# Patient Record
Sex: FEMALE | Race: WHITE | NOT HISPANIC OR LATINO | URBAN - METROPOLITAN AREA
[De-identification: names, ages, dates, MRNs, and addresses within clinical notes are randomized per-mention and may not be internally consistent; named-entity substitution may affect disease eponyms.]

---

## 2017-12-03 ENCOUNTER — EMERGENCY (EMERGENCY)
Facility: HOSPITAL | Age: 57
LOS: 1 days | Discharge: HOME | End: 2017-12-03
Attending: EMERGENCY MEDICINE | Admitting: EMERGENCY MEDICINE
Payer: COMMERCIAL

## 2017-12-03 VITALS
SYSTOLIC BLOOD PRESSURE: 117 MMHG | OXYGEN SATURATION: 100 % | RESPIRATION RATE: 18 BRPM | DIASTOLIC BLOOD PRESSURE: 67 MMHG | WEIGHT: 115.08 LBS | TEMPERATURE: 99 F | HEART RATE: 85 BPM

## 2017-12-03 PROCEDURE — 73120 X-RAY EXAM OF HAND: CPT | Mod: 26,RT

## 2017-12-03 PROCEDURE — 73110 X-RAY EXAM OF WRIST: CPT | Mod: 26,RT

## 2017-12-03 PROCEDURE — 73110 X-RAY EXAM OF WRIST: CPT

## 2017-12-03 PROCEDURE — 73120 X-RAY EXAM OF HAND: CPT

## 2017-12-03 PROCEDURE — 99284 EMERGENCY DEPT VISIT MOD MDM: CPT | Mod: 25

## 2017-12-03 PROCEDURE — 99283 EMERGENCY DEPT VISIT LOW MDM: CPT

## 2017-12-03 NOTE — ED PROVIDER NOTE - MEDICAL DECISION MAKING DETAILS
Pain at dorusm of right hand s/p hitting it against car door. no swelling, ttp at base of hand/wrist.  will get xray, patient declined pain control. FROM of hand and fingers.

## 2017-12-03 NOTE — ED PROVIDER NOTE - OBJECTIVE STATEMENT
57 year old female with right wrist pain s/p hitting it against car door yesterday morning. c/o pain to dorsum of right hand.  able to move it. took motrin yesterday and today. No other injuries.

## 2017-12-03 NOTE — ED ADULT NURSE NOTE - OBJECTIVE STATEMENT
Pt is a 56 yo female with the co right wrist pain sp hitting it against a car door yesterday morning. Pt motor and sensory intact, she is reporting pain to the dorsum of the right hand. She has been taking motrin at home with only partial relief. Pt denies any other complaints. She has no pmh.

## 2018-10-09 NOTE — ED ADULT TRIAGE NOTE - CCCP TRG CHIEF CMPLNT
10/03/18 1530 Wound Ankle Right;Medial  
Date First Assessed/Time First Assessed: 08/21/18 1033   POA: Yes  Wound Type: Vascular  Location: Ankle  Orientation: Right;Medial  
DRESSING STATUS Clean, dry, and intact DRESSING TYPE Absorptive Non-Pressure Injury Full thickness (subcut/muscle) Wound Length (cm) 1.4 cm Wound Width (cm) 2 cm Wound Depth (cm) 0.1 Wound Surface area (cm^2) 2.8 cm^2 Change in Wound Size % -1020 Condition of Base Pink;Slough Condition of Edges Open Tissue Type Pink;Red Tissue Type Percent Pink 80 Tissue Type Percent Red 20 Drainage Amount  Small Drainage Color Serosanguinous Wound Odor None Periwound Skin Condition Intact Cleansing and Cleansing Agents  Dermal wound cleanser (vashe) Dressing Type Applied (santyl,2x2,4x4,kaley,tubigrip) Procedure Tolerated Well Wound Sacral/coccyx Right Date First Assessed/Time First Assessed: 07/16/18 1147   POA: Yes  Wound Type: Pressure injury  Location: Sacral/coccyx  Orientation: Right DRESSING STATUS Clean, dry, and intact DRESSING TYPE Foam  
Non-Pressure Injury Partial thickness (epider/derm) Wound Length (cm) 1.5 cm Wound Width (cm) 1.2 cm Wound Depth (cm) 0.1 Wound Surface area (cm^2) 1.8 cm^2 Change in Wound Size % -87.5 Condition of Base White;Slough Condition of Edges Open Tissue Type Other (comment) (white) Drainage Amount  Small Drainage Color Clear Wound Odor None Periwound Skin Condition Intact Cleansing and Cleansing Agents  Dermal wound cleanser Dressing Type Applied (rula,mepitel one,mepilex border) Procedure Tolerated Well wrist pain/injury

## 2020-05-05 ENCOUNTER — APPOINTMENT (OUTPATIENT)
Dept: DISASTER EMERGENCY | Facility: HOSPITAL | Age: 60
End: 2020-05-05

## 2020-05-05 ENCOUNTER — MESSAGE (OUTPATIENT)
Age: 60
End: 2020-05-05

## 2020-05-07 LAB
SARS-COV-2 IGG SERPL IA-ACNC: <0.1 INDEX
SARS-COV-2 IGG SERPL QL IA: NEGATIVE

## 2022-01-01 NOTE — ED PROVIDER NOTE - CARDIAC, MLM
Mom nursing infant at the time of my visit. Deep latch noted with swallows heard. Mom chooses to pump the right breast while nursing infant on the left side, due to having an inverted right nipple. Mom expresses confidence in her plans and will call for assistance as needed. +s1s2

## 2022-01-07 ENCOUNTER — APPOINTMENT (OUTPATIENT)
Dept: MAMMOGRAPHY | Facility: CLINIC | Age: 62
End: 2022-01-07

## 2022-02-11 ENCOUNTER — APPOINTMENT (OUTPATIENT)
Dept: ULTRASOUND IMAGING | Facility: CLINIC | Age: 62
End: 2022-02-11
Payer: COMMERCIAL

## 2022-02-11 ENCOUNTER — APPOINTMENT (OUTPATIENT)
Dept: MAMMOGRAPHY | Facility: CLINIC | Age: 62
End: 2022-02-11
Payer: COMMERCIAL

## 2022-02-11 PROCEDURE — 76641 ULTRASOUND BREAST COMPLETE: CPT | Mod: 50

## 2022-02-11 PROCEDURE — 76856 US EXAM PELVIC COMPLETE: CPT

## 2022-02-11 PROCEDURE — 76830 TRANSVAGINAL US NON-OB: CPT

## 2022-02-11 PROCEDURE — 77063 BREAST TOMOSYNTHESIS BI: CPT

## 2022-02-11 PROCEDURE — 77067 SCR MAMMO BI INCL CAD: CPT

## 2022-03-06 ENCOUNTER — EMERGENCY (EMERGENCY)
Facility: HOSPITAL | Age: 62
LOS: 1 days | Discharge: HOME | End: 2022-03-06
Attending: STUDENT IN AN ORGANIZED HEALTH CARE EDUCATION/TRAINING PROGRAM
Payer: COMMERCIAL

## 2022-03-06 VITALS
TEMPERATURE: 98 F | OXYGEN SATURATION: 98 % | DIASTOLIC BLOOD PRESSURE: 66 MMHG | HEART RATE: 84 BPM | SYSTOLIC BLOOD PRESSURE: 104 MMHG | RESPIRATION RATE: 20 BRPM

## 2022-03-06 VITALS
DIASTOLIC BLOOD PRESSURE: 77 MMHG | RESPIRATION RATE: 20 BRPM | WEIGHT: 113.98 LBS | OXYGEN SATURATION: 97 % | HEART RATE: 100 BPM | TEMPERATURE: 98 F | HEIGHT: 64 IN | SYSTOLIC BLOOD PRESSURE: 147 MMHG

## 2022-03-06 LAB
ALBUMIN SERPL ELPH-MCNC: 4.5 G/DL — SIGNIFICANT CHANGE UP (ref 3.3–5)
ALP SERPL-CCNC: 85 U/L — SIGNIFICANT CHANGE UP (ref 40–120)
ALT FLD-CCNC: 16 U/L — SIGNIFICANT CHANGE UP (ref 10–45)
ANION GAP SERPL CALC-SCNC: 10 MMOL/L — SIGNIFICANT CHANGE UP (ref 5–17)
APTT BLD: 32.2 SEC — SIGNIFICANT CHANGE UP (ref 27.5–35.5)
AST SERPL-CCNC: 21 U/L — SIGNIFICANT CHANGE UP (ref 10–40)
BASOPHILS # BLD AUTO: 0.04 K/UL — SIGNIFICANT CHANGE UP (ref 0–0.2)
BASOPHILS NFR BLD AUTO: 0.6 % — SIGNIFICANT CHANGE UP (ref 0–2)
BILIRUB SERPL-MCNC: 0.3 MG/DL — SIGNIFICANT CHANGE UP (ref 0.2–1.2)
BLD GP AB SCN SERPL QL: NEGATIVE — SIGNIFICANT CHANGE UP
BUN SERPL-MCNC: 15 MG/DL — SIGNIFICANT CHANGE UP (ref 7–23)
CALCIUM SERPL-MCNC: 9.3 MG/DL — SIGNIFICANT CHANGE UP (ref 8.4–10.5)
CHLORIDE SERPL-SCNC: 102 MMOL/L — SIGNIFICANT CHANGE UP (ref 96–108)
CO2 SERPL-SCNC: 28 MMOL/L — SIGNIFICANT CHANGE UP (ref 22–31)
CREAT SERPL-MCNC: 0.83 MG/DL — SIGNIFICANT CHANGE UP (ref 0.5–1.3)
EGFR: 81 ML/MIN/1.73M2 — SIGNIFICANT CHANGE UP
EOSINOPHIL # BLD AUTO: 0.1 K/UL — SIGNIFICANT CHANGE UP (ref 0–0.5)
EOSINOPHIL NFR BLD AUTO: 1.4 % — SIGNIFICANT CHANGE UP (ref 0–6)
GLUCOSE SERPL-MCNC: 105 MG/DL — HIGH (ref 70–99)
HCT VFR BLD CALC: 43.6 % — SIGNIFICANT CHANGE UP (ref 34.5–45)
HGB BLD-MCNC: 14.4 G/DL — SIGNIFICANT CHANGE UP (ref 11.5–15.5)
IMM GRANULOCYTES NFR BLD AUTO: 0.3 % — SIGNIFICANT CHANGE UP (ref 0–1.5)
INR BLD: 1.05 RATIO — SIGNIFICANT CHANGE UP (ref 0.88–1.16)
LYMPHOCYTES # BLD AUTO: 1.43 K/UL — SIGNIFICANT CHANGE UP (ref 1–3.3)
LYMPHOCYTES # BLD AUTO: 20.6 % — SIGNIFICANT CHANGE UP (ref 13–44)
MCHC RBC-ENTMCNC: 30.4 PG — SIGNIFICANT CHANGE UP (ref 27–34)
MCHC RBC-ENTMCNC: 33 GM/DL — SIGNIFICANT CHANGE UP (ref 32–36)
MCV RBC AUTO: 92 FL — SIGNIFICANT CHANGE UP (ref 80–100)
MONOCYTES # BLD AUTO: 0.44 K/UL — SIGNIFICANT CHANGE UP (ref 0–0.9)
MONOCYTES NFR BLD AUTO: 6.3 % — SIGNIFICANT CHANGE UP (ref 2–14)
NEUTROPHILS # BLD AUTO: 4.92 K/UL — SIGNIFICANT CHANGE UP (ref 1.8–7.4)
NEUTROPHILS NFR BLD AUTO: 70.8 % — SIGNIFICANT CHANGE UP (ref 43–77)
NRBC # BLD: 0 /100 WBCS — SIGNIFICANT CHANGE UP (ref 0–0)
PLATELET # BLD AUTO: 204 K/UL — SIGNIFICANT CHANGE UP (ref 150–400)
POTASSIUM SERPL-MCNC: 4 MMOL/L — SIGNIFICANT CHANGE UP (ref 3.5–5.3)
POTASSIUM SERPL-SCNC: 4 MMOL/L — SIGNIFICANT CHANGE UP (ref 3.5–5.3)
PROT SERPL-MCNC: 7.1 G/DL — SIGNIFICANT CHANGE UP (ref 6–8.3)
PROTHROM AB SERPL-ACNC: 12.1 SEC — SIGNIFICANT CHANGE UP (ref 10.5–13.4)
RBC # BLD: 4.74 M/UL — SIGNIFICANT CHANGE UP (ref 3.8–5.2)
RBC # FLD: 11.4 % — SIGNIFICANT CHANGE UP (ref 10.3–14.5)
RH IG SCN BLD-IMP: POSITIVE — SIGNIFICANT CHANGE UP
SODIUM SERPL-SCNC: 140 MMOL/L — SIGNIFICANT CHANGE UP (ref 135–145)
WBC # BLD: 6.95 K/UL — SIGNIFICANT CHANGE UP (ref 3.8–10.5)
WBC # FLD AUTO: 6.95 K/UL — SIGNIFICANT CHANGE UP (ref 3.8–10.5)

## 2022-03-06 PROCEDURE — 93005 ELECTROCARDIOGRAM TRACING: CPT | Mod: XU

## 2022-03-06 PROCEDURE — U0003: CPT

## 2022-03-06 PROCEDURE — 71045 X-RAY EXAM CHEST 1 VIEW: CPT | Mod: 26

## 2022-03-06 PROCEDURE — 85025 COMPLETE CBC W/AUTO DIFF WBC: CPT

## 2022-03-06 PROCEDURE — 86901 BLOOD TYPING SEROLOGIC RH(D): CPT

## 2022-03-06 PROCEDURE — 25605 CLTX DST RDL FX/EPHYS SEP W/: CPT | Mod: RT

## 2022-03-06 PROCEDURE — 71045 X-RAY EXAM CHEST 1 VIEW: CPT

## 2022-03-06 PROCEDURE — 73080 X-RAY EXAM OF ELBOW: CPT | Mod: 26,RT

## 2022-03-06 PROCEDURE — 86900 BLOOD TYPING SEROLOGIC ABO: CPT

## 2022-03-06 PROCEDURE — 73110 X-RAY EXAM OF WRIST: CPT | Mod: 26,RT,76

## 2022-03-06 PROCEDURE — 73100 X-RAY EXAM OF WRIST: CPT

## 2022-03-06 PROCEDURE — 86850 RBC ANTIBODY SCREEN: CPT

## 2022-03-06 PROCEDURE — 99285 EMERGENCY DEPT VISIT HI MDM: CPT | Mod: 25

## 2022-03-06 PROCEDURE — 73090 X-RAY EXAM OF FOREARM: CPT

## 2022-03-06 PROCEDURE — 73110 X-RAY EXAM OF WRIST: CPT

## 2022-03-06 PROCEDURE — 80053 COMPREHEN METABOLIC PANEL: CPT

## 2022-03-06 PROCEDURE — 93010 ELECTROCARDIOGRAM REPORT: CPT

## 2022-03-06 PROCEDURE — U0005: CPT

## 2022-03-06 PROCEDURE — 85730 THROMBOPLASTIN TIME PARTIAL: CPT

## 2022-03-06 PROCEDURE — 73080 X-RAY EXAM OF ELBOW: CPT

## 2022-03-06 PROCEDURE — 85610 PROTHROMBIN TIME: CPT

## 2022-03-06 PROCEDURE — 36415 COLL VENOUS BLD VENIPUNCTURE: CPT

## 2022-03-06 PROCEDURE — 99285 EMERGENCY DEPT VISIT HI MDM: CPT

## 2022-03-06 PROCEDURE — 73090 X-RAY EXAM OF FOREARM: CPT | Mod: 26,LT,76

## 2022-03-06 RX ORDER — OXYCODONE HYDROCHLORIDE 5 MG/1
1 TABLET ORAL
Qty: 8 | Refills: 0
Start: 2022-03-06 | End: 2022-03-07

## 2022-03-06 RX ORDER — IBUPROFEN 200 MG
600 TABLET ORAL ONCE
Refills: 0 | Status: COMPLETED | OUTPATIENT
Start: 2022-03-06 | End: 2022-03-06

## 2022-03-06 RX ORDER — ACETAMINOPHEN 500 MG
975 TABLET ORAL ONCE
Refills: 0 | Status: COMPLETED | OUTPATIENT
Start: 2022-03-06 | End: 2022-03-06

## 2022-03-06 RX ADMIN — Medication 600 MILLIGRAM(S): at 18:16

## 2022-03-06 RX ADMIN — Medication 975 MILLIGRAM(S): at 18:16

## 2022-03-06 NOTE — CONSULT NOTE ADULT - CONSULT REASON
May 2, 2017      Gerri Garcia MD  [address]  [place] [address]           [facility] - Otorhinolaryngology  [address]  [place] [address]  Phone: [phone]  Fax: [phone]          Patient: Dominique Everett   MR Number: [id number]   YOB: 2008   Date of Visit: 4/28/2017       Dear Dr. Gerri Garcia:    Thank you for referring Dominique Everett to me for evaluation. Attached you will find relevant portions of my assessment and plan of care.    If you have questions, please do not hesitate to call me. I look forward to following Dominique Everett along with you.    Sincerely,    Silvia Baptiste, NP    Enclosure  CC:  No Recipients    If you would like to receive this communication electronically, please contact [email or web address] or [phone] to request more information on [facility] access.    For providers and/or their staff who would like to refer a patient to [facility], please contact us through our one-stop-shop provider referral line, [facility], at [phone].    If you feel you have received this communication in error or would no longer like to receive these types of communications, please e-mail [email or web address]         
distal radius fracture

## 2022-03-06 NOTE — CONSULT NOTE ADULT - SUBJECTIVE AND OBJECTIVE BOX
61yFemale presents to [facility] ED c/o severe R wrist pain s/p mechanical fall while playing tennis [employer]. 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 Patient denies head hit or LOC. Localizing pain to distal radius. Denies radiation of pain. Pt denies numbness, tingling or burning. LHD. Patient denies any other injuries.    PMH:    PSH:    AH:  penicillin (Unknown)    Meds: See med rec    T(C): 36.8 (03-06-22 @ 21:09)  HR: 84 (03-06-22 @ 21:09)  BP: 104/66 (03-06-22 @ 21:09)  RR: 20 (03-06-22 @ 21:09)  SpO2: 98% (03-06-22 @ 21:09)  Wt(kg): --    PE R UE  Skin intact, visible deformity of wrist, + soft tissue swelling, no ecchymosis; Decreased ROM of Wrist 2/2 pain. Normal Elbow/Shoulder ROM w/o pain. + TTP over DR/Ulna. + Rad Pulse 2+/4. SILT C5-T1, +AIN/PIN/Ulnar/Radial/Musc/Median, soft compartments;    SECONDARY EXAM: Benign, Skin intact, SILT throughout, motor grossly intact throughout, no other orthopedic injuries at this time, compartments soft and compressible    SPINE: Skin intact, no bony tenderness or step-offs appreciated throughout cervical/thoracic/lumbar/sacral spine    L UE: Skin intact, no erythema, ecchymosis, edema, gross deformity, NTTP over the bony prominences of the shoulder/elbow/wrist/hand, painless passive/active ROM of the shoulder/elbow/wrist/hand, C5-T1 SILT, motor grossly intact throughout axillary/musculocutaenous/radial/median/ulnar nerves, + radial pulse    BL LE: Skin intact, no erythema, ecchymosis, edema, gross deformity, NTTP over the bony prominences of the hip/knee/ankle/foot, painless passive/active ROM of the hip/knee/ankle/foot, L2-S1 SILT, motor grossly intact throughout hip flexors/quads/hams/TA/EHL/FHL/GSC, + DP/PT pulses, no pain with log roll, no pain on axial loading, compartments soft and compressible, calves nontender      Imaging:  XRay R Wrist  3 views of R Wrist demonstrates R distal radius fracture, extra-articular w/ posterior/anterior displacement of carpus/hand in relation to forearm. No other fx/dislocations noted.       Procedure Note:  After verbal consent obtained, ~ 10 cc of 1% Lidocaine injected into area around DR/Ulna as hematoma block. UE hung by fingers and reduction maneuver performed. Sugartong splint applied to Forearm/Wrist and mold held. ME XR obtained which show improved alignment of R DR Fracture. Pt NVI post procedure. Pt tolerated procedure well.    A/P: 61yFemale s/p Mech Fall w/ R Distal Radius Fracture  - Pain control  - Strict Ice/Elevation  - NWB R UE with splint and sling  - Keep splint clean/dry/intact;  - Encourage active finger motion to help with swelling  - Pt aware of possible need for surgical intervention of distal radius fracture. Will be scheduled for surgery electively with Dr. Squires  - Pt made aware of signs and symptoms of compartment syndrome. Aware of need to contact Doctor / Return to ED  - All Pt's / Family Members questions answered, Pt/family understand plan.  - FU w/ Dr. Squires and likely surgery on Tuesday

## 2022-03-06 NOTE — ED PROVIDER NOTE - ATTENDING CONTRIBUTION TO CARE
I, Stefan Warner, performed a history and physical exam of the patient and discussed their management with the resident and/or advanced care provider. I reviewed the resident and/or advanced care provider's note and agree with the documented findings and plan of care. I was present and available for all procedures.    62yo female pt with PMHx of SVT (Inderal PRN) presents to ED with non dominant right wrist pain/swelling/deformity s/p fall while playing tennis this afternoon. Denies LOC, head injury or other injuries. Denies headache, dizziness, visual changes or N/V. Denies sensory changes or weakness to extremities. Denies neck or back pain. Denies CP/SOB/ABD pain or pelvic/hip pain. Denies fever, chills, cough or congestion.    Primary Survey  A - airway intact  B - bilateral breath sounds and good chest rise  C - initially BP: ##### , palpable pulses in all extremities  D - GCS ##### on arrival  Exposure obtained    Secondary survey  Gen: NAD  HEENT: NC, C-spine no ttp, pupils 3mm equal & reactive  CV: pulse regularly present, no chest wall ttp  Pulm: CTA B/L  Chest: intact without ttp  Abd: Soft, ND, NT, no rebound, no guarding  Groin: Normal appearing  Ext: Palp radial b/l, palp DP b/l, stable pelvis, rue with deformity at distal forearm, s/s intact in distal hand and without ttp at elbow or right shoulder, normal FROM bilateral le's and lue   Back: no TTP, no palpable runoff, stepoff, or deformity, no c/t/l spine midline ttp,    isolated rue fractures in distal forearm no head trauma, will eval with xrays analgesia prn, patient and  at bedside requesting orthopedics for fracture reduction and splinting, Follow up optnt after reduction I, Stefan Warner, performed a history and physical exam of the patient and discussed their management with the resident and/or advanced care provider. I reviewed the resident and/or advanced care provider's note and agree with the documented findings and plan of care. I was present and available for all procedures.    62yo female pt with PMHx of SVT (Inderal PRN) presents to ED with non dominant right wrist pain/swelling/deformity s/p fall while playing tennis this afternoon. Denies LOC, head injury or other injuries. Denies headache, dizziness, visual changes or N/V. Denies sensory changes or weakness to extremities. Denies neck or back pain. Denies CP/SOB/ABD pain or pelvic/hip pain. Denies fever, chills, cough or congestion.    Primary Survey  A - airway intact  B - bilateral breath sounds and good chest rise  C - initially BP: 147/77, palpable pulses in all extremities  D - GCS15 on arrival  Exposure obtained    Secondary survey  Gen: NAD  HEENT: NC, C-spine no ttp, pupils 3mm equal & reactive  CV: pulse regularly present, no chest wall ttp  Pulm: CTA B/L  Chest: intact without ttp  Abd: Soft, ND, NT, no rebound, no guarding  Groin: Normal appearing  Ext: Palp radial b/l, palp DP b/l, stable pelvis, rue with deformity at distal forearm, s/s intact in distal hand and without ttp at elbow or right shoulder, normal FROM bilateral le's and lue   Back: no TTP, no palpable runoff, stepoff, or deformity, no c/t/l spine midline ttp,    isolated rue fractures in distal forearm no head trauma, will eval with xrays analgesia prn, patient and  at bedside requesting orthopedics for fracture reduction and splinting, Follow up optnt after reduction

## 2022-03-06 NOTE — ED PROVIDER NOTE - OBJECTIVE STATEMENT
62yo female pt with PMHx of SVT (Inderal PRN) presents to ED with non dominant right wrist pain/swelling/deformity s/p fall while playing tennis this afternoon. Denies LOC, head injury or other injuries. Denies headache, dizziness, visual changes or N/V. Denies sensory changes or weakness to extremities. Denies neck or back pain. Denies CP/SOB/ABD pain or pelvic/hip pain. Denies fever, chills, cough or congestion.

## 2022-03-06 NOTE — ED PROVIDER NOTE - PATIENT PORTAL LINK FT
You can access the FollowMyHealth Patient Portal offered by [facility] by registering at the following website: [email or web address]. By joining [facility]’s FollowMyHealth portal, you will also be able to view your health information using other applications (apps) compatible with our system.

## 2022-03-06 NOTE — ED PROVIDER NOTE - NSFOLLOWUPINSTRUCTIONS_ED_ALL_ED_FT
Elevate the affected wrist.    Keep the splint clean and dry.    Ice over the splint; every 2hours for 20minutes.    Tylenol for pain as package directed and Oxycodone as prescribed for severe pain with cautions of drowsiness and constipation.    Stool softener as needed.    Follow up with Dr. Squires as scheduled.    Return for any concerns or worsening symptoms.    Please see the information of wrist fracture and splint care.

## 2022-03-06 NOTE — ED PROVIDER NOTE - PHYSICAL EXAMINATION
NAD, VSS, Afebrile, Lungs clear. No spinal tender. ABD soft, non tender. No CVA tender. No pelvic or hip tender. +Right wrist swelling, deformity, tender, and diminished ROMs. N/V- intact.

## 2022-03-06 NOTE — ED ADULT NURSE NOTE - OBJECTIVE STATEMENT
61 year old female patient presents ambulatory to ED c/o R wrist pain injury while playing tennis. Patient has obvious deformity to R wrist and is unable to move fourth and fifth digit. Pulses and sensory function intact. Patient denies other pain or injuries. Patient made aware of plan of care for monitoring. Family at bedside

## 2022-03-06 NOTE — ED PROVIDER NOTE - CARE PROVIDER_API CALL
Mello Squires (MD)  Orthopaedic Surgery  [address] [place] [address], [address]  [place] [address]  Phone: [phone]  Fax: [phone]  Follow Up Time:

## 2022-03-06 NOTE — ED PROVIDER NOTE - PROGRESS NOTE DETAILS
Ortho at bedside. Closed reduction of right wrist and STS performed by ortho. Pt's evaluated by Dr. Squires and scheduled surgery on Tuesday morning. Will d/c to home today after preop.

## 2022-03-06 NOTE — ED ADULT NURSE REASSESSMENT NOTE - NS ED NURSE REASSESS COMMENT FT1
Pt tolerated rt wrist reduction by ortho well, right arm splint and placed in sling care instructions review with pt,  plan for procedure this week , pre op labs and covid swab collected and sent. pending dispo

## 2022-03-06 NOTE — ED ADULT NURSE REASSESSMENT NOTE - NS ED NURSE REASSESS COMMENT FT1
Pt lying in stretcher with ortho assessing at bedside, right hand deformity noted, plan for nerve block and fx reduction and splinting, pt updated

## 2022-03-07 LAB — SARS-COV-2 RNA SPEC QL NAA+PROBE: SIGNIFICANT CHANGE UP

## 2022-03-25 ENCOUNTER — APPOINTMENT (OUTPATIENT)
Dept: MRI IMAGING | Facility: IMAGING CENTER | Age: 62
End: 2022-03-25
Payer: COMMERCIAL

## 2022-03-25 ENCOUNTER — OUTPATIENT (OUTPATIENT)
Dept: OUTPATIENT SERVICES | Facility: HOSPITAL | Age: 62
LOS: 1 days | End: 2022-03-25
Payer: COMMERCIAL

## 2022-03-25 DIAGNOSIS — Z00.8 ENCOUNTER FOR OTHER GENERAL EXAMINATION: ICD-10-CM

## 2022-03-25 PROCEDURE — A9585: CPT

## 2022-03-25 PROCEDURE — 77049 MRI BREAST C-+ W/CAD BI: CPT | Mod: 26

## 2022-03-25 PROCEDURE — C8908: CPT

## 2022-03-25 PROCEDURE — C8937: CPT

## 2022-10-10 ENCOUNTER — OUTPATIENT (OUTPATIENT)
Dept: OUTPATIENT SERVICES | Facility: HOSPITAL | Age: 62
LOS: 1 days | End: 2022-10-10
Payer: COMMERCIAL

## 2022-10-10 ENCOUNTER — APPOINTMENT (OUTPATIENT)
Dept: MRI IMAGING | Facility: IMAGING CENTER | Age: 62
End: 2022-10-10

## 2022-10-10 DIAGNOSIS — Z00.8 ENCOUNTER FOR OTHER GENERAL EXAMINATION: ICD-10-CM

## 2022-10-10 PROCEDURE — A9585: CPT

## 2022-10-10 PROCEDURE — C8937: CPT

## 2022-10-10 PROCEDURE — 77049 MRI BREAST C-+ W/CAD BI: CPT | Mod: 26

## 2022-10-10 PROCEDURE — C8908: CPT

## 2023-03-14 ENCOUNTER — APPOINTMENT (OUTPATIENT)
Dept: ULTRASOUND IMAGING | Facility: CLINIC | Age: 63
End: 2023-03-14
Payer: COMMERCIAL

## 2023-03-14 ENCOUNTER — APPOINTMENT (OUTPATIENT)
Dept: MAMMOGRAPHY | Facility: CLINIC | Age: 63
End: 2023-03-14
Payer: COMMERCIAL

## 2023-03-14 PROCEDURE — 77063 BREAST TOMOSYNTHESIS BI: CPT

## 2023-03-14 PROCEDURE — 76856 US EXAM PELVIC COMPLETE: CPT

## 2023-03-14 PROCEDURE — 76830 TRANSVAGINAL US NON-OB: CPT

## 2023-03-14 PROCEDURE — 76641 ULTRASOUND BREAST COMPLETE: CPT | Mod: 50

## 2023-03-14 PROCEDURE — 77067 SCR MAMMO BI INCL CAD: CPT

## 2023-03-29 ENCOUNTER — TRANSCRIPTION ENCOUNTER (OUTPATIENT)
Age: 63
End: 2023-03-29

## 2023-04-11 ENCOUNTER — OUTPATIENT (OUTPATIENT)
Dept: OUTPATIENT SERVICES | Facility: HOSPITAL | Age: 63
LOS: 1 days | End: 2023-04-11
Payer: COMMERCIAL

## 2023-04-11 ENCOUNTER — APPOINTMENT (OUTPATIENT)
Dept: MRI IMAGING | Facility: IMAGING CENTER | Age: 63
End: 2023-04-11
Payer: COMMERCIAL

## 2023-04-11 DIAGNOSIS — Z00.8 ENCOUNTER FOR OTHER GENERAL EXAMINATION: ICD-10-CM

## 2023-04-11 DIAGNOSIS — Z80.3 FAMILY HISTORY OF MALIGNANT NEOPLASM OF BREAST: ICD-10-CM

## 2023-04-11 PROCEDURE — 77049 MRI BREAST C-+ W/CAD BI: CPT | Mod: 26

## 2023-04-11 PROCEDURE — C8937: CPT

## 2023-04-11 PROCEDURE — C8908: CPT

## 2023-04-11 PROCEDURE — A9585: CPT

## 2023-05-30 ENCOUNTER — APPOINTMENT (OUTPATIENT)
Dept: PLASTIC SURGERY | Facility: CLINIC | Age: 63
End: 2023-05-30

## 2023-08-25 ENCOUNTER — NON-APPOINTMENT (OUTPATIENT)
Age: 63
End: 2023-08-25

## 2023-09-22 NOTE — ED ADULT TRIAGE NOTE - BRAND OF FIRST COVID-19 BOOSTER
Moderna
45-year-old female past medical history of gastric bypass in  (c/b bradycardia and palpitations postop), tubal ligation,  x2 presenting to emergency department for 3 to 4 days of acute onset, progressively worsening left upper quadrant pain with associated nausea vomiting. Differential diagnosis includes but is not limited to GERD, gastritis, gastric ulcer, PUD, pancreatitis, gastroenteritis. Exam notable for TTP in LUQ. Afebrile without systemic infectious signs or symptoms. Hemodynamically stable. Given hx of gastric bypass will follow repletion protocol thiamine, multivitamin, LR. Surgery consulted. Will get screening labs, lipase, VBG, CTAP with PO and IV. Low suspicion for obstruction +BM, +flatus. Will reassess, dispo pending labs and imaging. Symptomatic relief with gi cocktail, zofran. Ofirmev for pain.

## 2024-04-23 ENCOUNTER — APPOINTMENT (OUTPATIENT)
Dept: ULTRASOUND IMAGING | Facility: CLINIC | Age: 64
End: 2024-04-23
Payer: COMMERCIAL

## 2024-04-23 ENCOUNTER — APPOINTMENT (OUTPATIENT)
Dept: MAMMOGRAPHY | Facility: CLINIC | Age: 64
End: 2024-04-23
Payer: COMMERCIAL

## 2024-04-23 PROCEDURE — 77063 BREAST TOMOSYNTHESIS BI: CPT

## 2024-04-23 PROCEDURE — 76641 ULTRASOUND BREAST COMPLETE: CPT | Mod: 50

## 2024-04-23 PROCEDURE — 77067 SCR MAMMO BI INCL CAD: CPT

## 2024-05-07 ENCOUNTER — APPOINTMENT (OUTPATIENT)
Dept: ULTRASOUND IMAGING | Facility: CLINIC | Age: 64
End: 2024-05-07
Payer: COMMERCIAL

## 2024-05-07 PROCEDURE — 76830 TRANSVAGINAL US NON-OB: CPT

## 2024-05-07 PROCEDURE — 76856 US EXAM PELVIC COMPLETE: CPT

## 2024-08-20 ENCOUNTER — OUTPATIENT (OUTPATIENT)
Dept: OUTPATIENT SERVICES | Facility: HOSPITAL | Age: 64
LOS: 1 days | End: 2024-08-20
Payer: COMMERCIAL

## 2024-08-20 ENCOUNTER — APPOINTMENT (OUTPATIENT)
Dept: MRI IMAGING | Facility: IMAGING CENTER | Age: 64
End: 2024-08-20

## 2024-08-20 DIAGNOSIS — Z91.89 OTHER SPECIFIED PERSONAL RISK FACTORS, NOT ELSEWHERE CLASSIFIED: ICD-10-CM

## 2024-08-20 PROCEDURE — A9585: CPT

## 2024-08-20 PROCEDURE — C8937: CPT

## 2024-08-20 PROCEDURE — 77049 MRI BREAST C-+ W/CAD BI: CPT | Mod: 26

## 2024-08-20 PROCEDURE — C8908: CPT

## 2024-09-06 PROBLEM — Z91.89 INCREASED RISK OF BREAST CANCER: Status: ACTIVE | Noted: 2024-09-06

## 2024-09-06 PROBLEM — Z80.3 FAMILY HISTORY OF BREAST CANCER: Status: ACTIVE | Noted: 2024-09-06

## 2024-09-09 ENCOUNTER — APPOINTMENT (OUTPATIENT)
Dept: PLASTIC SURGERY | Facility: CLINIC | Age: 64
End: 2024-09-09
Payer: COMMERCIAL

## 2024-09-09 VITALS
DIASTOLIC BLOOD PRESSURE: 82 MMHG | WEIGHT: 115 LBS | BODY MASS INDEX: 19.63 KG/M2 | TEMPERATURE: 97.6 F | OXYGEN SATURATION: 98 % | SYSTOLIC BLOOD PRESSURE: 122 MMHG | HEIGHT: 64 IN | HEART RATE: 82 BPM

## 2024-09-09 DIAGNOSIS — Z80.3 FAMILY HISTORY OF MALIGNANT NEOPLASM OF BREAST: ICD-10-CM

## 2024-09-09 DIAGNOSIS — Z91.89 OTHER SPECIFIED PERSONAL RISK FACTORS, NOT ELSEWHERE CLASSIFIED: ICD-10-CM

## 2024-09-09 PROCEDURE — 99203 OFFICE O/P NEW LOW 30 MIN: CPT

## 2024-09-09 NOTE — PHYSICAL EXAM
[Normocephalic] : normocephalic [Atraumatic] : atraumatic [EOMI] : extra ocular movement intact [Sclera nonicteric] : sclera nonicteric [Supple] : supple [No Supraclavicular Adenopathy] : no supraclavicular adenopathy [No Cervical Adenopathy] : no cervical adenopathy [No Thyromegaly] : no thyromegaly [Clear to Auscultation Bilat] : clear to auscultation bilaterally [Normal Sinus Rhythm] : normal sinus rhythm [Normal S1, S2] : normal S1 and S2 [No Gallops] : no gallops [No Rubs] : no pericardial rub [Examined in the supine and seated position] : examined in the supine and seated position [Bra Size: ___] : Bra Size: [unfilled] [No dominant masses] : no dominant masses in right breast  [No dominant masses] : no dominant masses left breast [No Nipple Retraction] : no left nipple retraction [No Nipple Discharge] : no left nipple discharge [No Axillary Lymphadenopathy] : no left axillary lymphadenopathy [No Edema] : no edema [No Rashes] : no rashes [No Ulceration] : no ulceration [de-identified] : sternal area palpable mass consistent with sebaceous cyst without infection. [de-identified] : no skin lesion to account for the MRI finding. [de-identified] : no skin lesion to account for the MRI finding.

## 2024-09-09 NOTE — HISTORY OF PRESENT ILLNESS
[FreeTextEntry1] : Angela is a 64 year old female here for a consultation.  She was referred by Dr. Eleuterio Fitzgerald. 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 She has a family history of breast cancer in her mother diagnosed at age 72 (no genetic testing performed). She has a paternal cousin diagnosed with ovarian cancer age 55 (unsure if she had genetic testing) 3/30/2023 [facility] genetic testing negative She has a history of taking HRT x 5-6 yrs. She last took it 1 year ago. 4/23/2024 Bilateral mammogram: [facility] Lifetime Risk: 26.0%. The breasts are extremely dense, which lowers the sensitivity of mammography. No suspicious mass, suspicious microcalcifications, or other sign of malignancy is identified. BREAST ARTERIAL CALCIFICATION (MIGUEL): Grade 0 - No vascular calcifications. Note: The absence of breast arterial calcification does not exclude cardiovascular disease. Management of cardiovascular risk factors should be based clinically. Bilateral ultrasound:   RIGHT BREAST: No suspicious solid mass. LEFT BREAST: No suspicious solid mass. BI-RADS 1 8/20/2024 Bilateral breast MRI:  RIGHT BREAST: There are scattered enhancing nonspecific foci. There is no suspicious enhancement in the right breast.  Upper outer right breast 5 mm focal nonmass enhancement without significant change compared to 10/2022 (27344#46). Upper inner right breast 5 mm focal nonmass enhancement without significant change compared to 10/2022 (87544#64). LEFT BREAST: There are scattered enhancing nonspecific foci. There is no suspicious enhancement in the left breast. AXILLA/OTHER: There is no significant axillary or internal mammary lymphadenopathy. 8 mm T2 hyperintense subdermal lesion overlying midline/sternum may represent a sebaceous cyst correlate with physical exam (3#19). 3 mm enhancing dermal lesion along outer right nipple areola complex (57770#41), and 4 mm dermal lesion in the posterior upper inner left breast (48948#82) correlate with physical exam. BI-RADS 2 She is here for evaluation given her increased risk. She has a known sternal region sebaceous cyst. She denies any history of inflammation. She has an appt to see Dr. Bradley next month and will plan on undergoing excision of the sebaceous cyst. She came alone.

## 2024-09-09 NOTE — CONSULT LETTER
[Dear  ___] : Dear  [unfilled], [Consult Letter:] : I had the pleasure of evaluating your patient, [unfilled]. [Please see my note below.] : Please see my note below. [Sincerely,] : Sincerely, [Consult Closing:] : Thank you very much for allowing me to participate in the care of this patient.  If you have any questions, please do not hesitate to contact me. [FreeTextEntry3] : Susan M. Palleschi, MD, FACS Division of Breast Surgery Director, Breast Surgery [address] [address] (Phone) [phone] (Fax) [phone]  [DrPartha  ___] : Dr. WEST [DrPartha ___] : Dr. WEST

## 2024-09-09 NOTE — ASSESSMENT
[FreeTextEntry1] : Family history of breast cancer, elevated BECK risk for which she meets criteria for high risk screening breast MRI. Clinical breast exam benign   1. Annual bilateral mammogram and breast ultrasound due 4/2025 2. Follow up office visit 1 year (9/2025) 3. Advised monthly self breast examinations and advised her to contact me if she has any concerns.  4. Bilateral breast MRI with IV contrast due 10/2025 (alternating with the timing of her annual mammogram/US) [Mother] : mother [Fruit] : fruit [Vegetables] : vegetables [Meat] : meat [Grains] : grains [Eggs] : eggs [Fish] : fish [Dairy] : dairy [Eats meals with family] : eats meals with family [Normal] : Normal [Sleeps ___ hours per night] : sleeps [unfilled] hours per night [Brushing teeth twice/d] : brushing teeth twice per day [Yes] : Patient goes to dentist yearly [Participates in after-school activities] : participates in after-school activities [< 2 hrs of screen time per day] : less than 2 hrs of screen time per day [Grade ___] : Grade [unfilled] [Adequate social interactions] : adequate social interactions [Adequate behavior] : adequate behavior [Adequate performance] : adequate performance [Adequate attention] : adequate attention [No difficulties with Homework] : no difficulties with homework [Appropriately restrained in motor vehicle] : appropriately restrained in motor vehicle [Supervised outdoor play] : supervised outdoor play [Wears helmet and pads] : wears helmet and pads [Parent knows child's friends] : parent knows child's friends [Monitored computer use] : monitored computer use [Up to date] : Up to date [___ stools per day] : [unfilled]  stools per day [whole] : whole milk [Appropiate parent-child-sibling interaction] : appropriate parent-child-sibling interaction [No] : No cigarette smoke exposure [Gun in Home] : no gun in home [Exposure to electronic nicotine delivery system] : No exposure to electronic nicotine delivery system [de-identified] : Water, milk whole 1/2 cup daily, Has 1-2 sevings fruits and vegetables [FluorideSource] : [facility] [de-identified] : 04/09/19 next appt.  Hx caries [FreeTextEntry9] : No physical activity parents working alternate shifts, plays with brother, too much screen time always on in back ground [FreeTextEntry1] : HA frontal, ST with cough, no fever, stuffy nose 4 days.  Not sleeping well, no V/D.  Nl po.

## 2024-09-09 NOTE — CONSULT LETTER
[Dear  ___] : Dear  [unfilled], [Consult Letter:] : I had the pleasure of evaluating your patient, [unfilled]. [Please see my note below.] : Please see my note below. [Sincerely,] : Sincerely, [Consult Closing:] : Thank you very much for allowing me to participate in the care of this patient.  If you have any questions, please do not hesitate to contact me. [FreeTextEntry3] : Susan M. Palleschi, MD, FACS Division of Breast Surgery Director, Breast Surgery [address] [address] (Phone) [phone] (Fax) [phone]  [DrPartha  ___] : Dr. WEST [DrPartha ___] : Dr. WEST

## 2024-09-09 NOTE — ASSESSMENT
[FreeTextEntry1] : Family history of breast cancer, elevated BECK risk for which she meets criteria for high risk screening breast MRI. Clinical breast exam benign   1. Annual bilateral mammogram and breast ultrasound due 4/2025 2. Follow up office visit 1 year (9/2025) 3. Advised monthly self breast examinations and advised her to contact me if she has any concerns.  4. Bilateral breast MRI with IV contrast due 10/2025 (alternating with the timing of her annual mammogram/US)

## 2024-09-09 NOTE — PHYSICAL EXAM
[Normocephalic] : normocephalic [Atraumatic] : atraumatic [EOMI] : extra ocular movement intact [Sclera nonicteric] : sclera nonicteric [Supple] : supple [No Supraclavicular Adenopathy] : no supraclavicular adenopathy [No Cervical Adenopathy] : no cervical adenopathy [No Thyromegaly] : no thyromegaly [Clear to Auscultation Bilat] : clear to auscultation bilaterally [Normal Sinus Rhythm] : normal sinus rhythm [Normal S1, S2] : normal S1 and S2 [No Gallops] : no gallops [No Rubs] : no pericardial rub [Examined in the supine and seated position] : examined in the supine and seated position [Bra Size: ___] : Bra Size: [unfilled] [No dominant masses] : no dominant masses in right breast  [No dominant masses] : no dominant masses left breast [No Nipple Retraction] : no left nipple retraction [No Nipple Discharge] : no left nipple discharge [No Axillary Lymphadenopathy] : no left axillary lymphadenopathy [No Edema] : no edema [No Rashes] : no rashes [No Ulceration] : no ulceration [de-identified] : sternal area palpable mass consistent with sebaceous cyst without infection. [de-identified] : no skin lesion to account for the MRI finding. [de-identified] : no skin lesion to account for the MRI finding.

## 2024-09-09 NOTE — HISTORY OF PRESENT ILLNESS
[FreeTextEntry1] : Angela is a 64 year old female here for a consultation.  She was referred by Dr. Eleuterio Fitzgerald. 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 She has a family history of breast cancer in her mother diagnosed at age 72 (no genetic testing performed). She has a paternal cousin diagnosed with ovarian cancer age 55 (unsure if she had genetic testing) 3/30/2023 [facility] genetic testing negative She has a history of taking HRT x 5-6 yrs. She last took it 1 year ago. 4/23/2024 Bilateral mammogram: [facility] Lifetime Risk: 26.0%. The breasts are extremely dense, which lowers the sensitivity of mammography. No suspicious mass, suspicious microcalcifications, or other sign of malignancy is identified. BREAST ARTERIAL CALCIFICATION (MIGUEL): Grade 0 - No vascular calcifications. Note: The absence of breast arterial calcification does not exclude cardiovascular disease. Management of cardiovascular risk factors should be based clinically. Bilateral ultrasound:   RIGHT BREAST: No suspicious solid mass. LEFT BREAST: No suspicious solid mass. BI-RADS 1 8/20/2024 Bilateral breast MRI:  RIGHT BREAST: There are scattered enhancing nonspecific foci. There is no suspicious enhancement in the right breast.  Upper outer right breast 5 mm focal nonmass enhancement without significant change compared to 10/2022 (29883#46). Upper inner right breast 5 mm focal nonmass enhancement without significant change compared to 10/2022 (30160#64). LEFT BREAST: There are scattered enhancing nonspecific foci. There is no suspicious enhancement in the left breast. AXILLA/OTHER: There is no significant axillary or internal mammary lymphadenopathy. 8 mm T2 hyperintense subdermal lesion overlying midline/sternum may represent a sebaceous cyst correlate with physical exam (3#19). 3 mm enhancing dermal lesion along outer right nipple areola complex (57099#41), and 4 mm dermal lesion in the posterior upper inner left breast (86449#82) correlate with physical exam. BI-RADS 2 She is here for evaluation given her increased risk. She has a known sternal region sebaceous cyst. She denies any history of inflammation. She has an appt to see Dr. Bradley next month and will plan on undergoing excision of the sebaceous cyst. She came alone.

## 2024-09-09 NOTE — HISTORY OF PRESENT ILLNESS
[FreeTextEntry1] : Angela is a 64 year old female here for a consultation.  She was referred by Dr. Eleuterio Fitzgerald. 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 She has a family history of breast cancer in her mother diagnosed at age 72 (no genetic testing performed). She has a paternal cousin diagnosed with ovarian cancer age 55 (unsure if she had genetic testing) 3/30/2023 [facility] genetic testing negative She has a history of taking HRT x 5-6 yrs. She last took it 1 year ago. 4/23/2024 Bilateral mammogram: [facility] Lifetime Risk: 26.0%. The breasts are extremely dense, which lowers the sensitivity of mammography. No suspicious mass, suspicious microcalcifications, or other sign of malignancy is identified. BREAST ARTERIAL CALCIFICATION (MIGUEL): Grade 0 - No vascular calcifications. Note: The absence of breast arterial calcification does not exclude cardiovascular disease. Management of cardiovascular risk factors should be based clinically. Bilateral ultrasound:   RIGHT BREAST: No suspicious solid mass. LEFT BREAST: No suspicious solid mass. BI-RADS 1 8/20/2024 Bilateral breast MRI:  RIGHT BREAST: There are scattered enhancing nonspecific foci. There is no suspicious enhancement in the right breast.  Upper outer right breast 5 mm focal nonmass enhancement without significant change compared to 10/2022 (76726#46). Upper inner right breast 5 mm focal nonmass enhancement without significant change compared to 10/2022 (11475#64). LEFT BREAST: There are scattered enhancing nonspecific foci. There is no suspicious enhancement in the left breast. AXILLA/OTHER: There is no significant axillary or internal mammary lymphadenopathy. 8 mm T2 hyperintense subdermal lesion overlying midline/sternum may represent a sebaceous cyst correlate with physical exam (3#19). 3 mm enhancing dermal lesion along outer right nipple areola complex (45716#41), and 4 mm dermal lesion in the posterior upper inner left breast (49989#82) correlate with physical exam. BI-RADS 2 She is here for evaluation given her increased risk. She has a known sternal region sebaceous cyst. She denies any history of inflammation. She has an appt to see Dr. Bradley next month and will plan on undergoing excision of the sebaceous cyst. She came alone.

## 2024-09-09 NOTE — PHYSICAL EXAM
[Normocephalic] : normocephalic [Atraumatic] : atraumatic [EOMI] : extra ocular movement intact [Sclera nonicteric] : sclera nonicteric [Supple] : supple [No Supraclavicular Adenopathy] : no supraclavicular adenopathy [No Cervical Adenopathy] : no cervical adenopathy [No Thyromegaly] : no thyromegaly [Clear to Auscultation Bilat] : clear to auscultation bilaterally [Normal Sinus Rhythm] : normal sinus rhythm [Normal S1, S2] : normal S1 and S2 [No Gallops] : no gallops [No Rubs] : no pericardial rub [Examined in the supine and seated position] : examined in the supine and seated position [Bra Size: ___] : Bra Size: [unfilled] [No dominant masses] : no dominant masses in right breast  [No dominant masses] : no dominant masses left breast [No Nipple Retraction] : no left nipple retraction [No Nipple Discharge] : no left nipple discharge [No Axillary Lymphadenopathy] : no left axillary lymphadenopathy [No Edema] : no edema [No Rashes] : no rashes [No Ulceration] : no ulceration [de-identified] : sternal area palpable mass consistent with sebaceous cyst without infection. [de-identified] : no skin lesion to account for the MRI finding. [de-identified] : no skin lesion to account for the MRI finding.

## 2024-09-09 NOTE — CONSULT LETTER
[Dear  ___] : Dear  [unfilled], [Consult Letter:] : I had the pleasure of evaluating your patient, [unfilled]. [Please see my note below.] : Please see my note below. [Sincerely,] : Sincerely, [Consult Closing:] : Thank you very much for allowing me to participate in the care of this patient.  If you have any questions, please do not hesitate to contact me. [FreeTextEntry3] : Susan M. Palleschi, MD, FACS Division of Breast Surgery Director, Breast Surgery [address] [address] (Phone) [phone] (Fax) [phone]  [DrPartha  ___] : Dr. WEST [DrPartha ___] : Dr. WEST

## 2025-01-27 ENCOUNTER — APPOINTMENT (OUTPATIENT)
Dept: PLASTIC SURGERY | Facility: CLINIC | Age: 65
End: 2025-01-27
Payer: COMMERCIAL

## 2025-01-27 VITALS
BODY MASS INDEX: 19.63 KG/M2 | OXYGEN SATURATION: 97 % | WEIGHT: 115 LBS | SYSTOLIC BLOOD PRESSURE: 110 MMHG | TEMPERATURE: 98.2 F | HEART RATE: 79 BPM | DIASTOLIC BLOOD PRESSURE: 70 MMHG | HEIGHT: 64 IN

## 2025-01-27 DIAGNOSIS — N64.4 MASTODYNIA: ICD-10-CM

## 2025-01-27 DIAGNOSIS — Z91.89 OTHER SPECIFIED PERSONAL RISK FACTORS, NOT ELSEWHERE CLASSIFIED: ICD-10-CM

## 2025-01-27 DIAGNOSIS — R92.30 DENSE BREASTS, UNSPECIFIED: ICD-10-CM

## 2025-01-27 DIAGNOSIS — Z80.3 FAMILY HISTORY OF MALIGNANT NEOPLASM OF BREAST: ICD-10-CM

## 2025-01-27 PROCEDURE — 99213 OFFICE O/P EST LOW 20 MIN: CPT

## 2025-04-07 ENCOUNTER — RESULT REVIEW (OUTPATIENT)
Age: 65
End: 2025-04-07

## 2025-04-07 ENCOUNTER — APPOINTMENT (OUTPATIENT)
Dept: MAMMOGRAPHY | Facility: CLINIC | Age: 65
End: 2025-04-07
Payer: COMMERCIAL

## 2025-04-07 ENCOUNTER — APPOINTMENT (OUTPATIENT)
Dept: ULTRASOUND IMAGING | Facility: CLINIC | Age: 65
End: 2025-04-07
Payer: COMMERCIAL

## 2025-04-07 PROCEDURE — 77063 BREAST TOMOSYNTHESIS BI: CPT

## 2025-04-07 PROCEDURE — 77067 SCR MAMMO BI INCL CAD: CPT

## 2025-04-07 PROCEDURE — 76641 ULTRASOUND BREAST COMPLETE: CPT | Mod: 50

## 2025-04-14 ENCOUNTER — RESULT REVIEW (OUTPATIENT)
Age: 65
End: 2025-04-14

## 2025-04-15 ENCOUNTER — APPOINTMENT (OUTPATIENT)
Dept: RADIOLOGY | Facility: CLINIC | Age: 65
End: 2025-04-15
Payer: COMMERCIAL

## 2025-04-15 PROCEDURE — 77080 DXA BONE DENSITY AXIAL: CPT

## 2025-09-19 ENCOUNTER — APPOINTMENT (OUTPATIENT)
Dept: PLASTIC SURGERY | Facility: CLINIC | Age: 65
End: 2025-09-19
Payer: COMMERCIAL

## 2025-09-19 VITALS
BODY MASS INDEX: 19.63 KG/M2 | HEIGHT: 64 IN | WEIGHT: 115 LBS | OXYGEN SATURATION: 97 % | HEART RATE: 87 BPM | DIASTOLIC BLOOD PRESSURE: 71 MMHG | TEMPERATURE: 98.3 F | SYSTOLIC BLOOD PRESSURE: 106 MMHG

## 2025-09-19 DIAGNOSIS — Z91.89 OTHER SPECIFIED PERSONAL RISK FACTORS, NOT ELSEWHERE CLASSIFIED: ICD-10-CM

## 2025-09-19 PROCEDURE — 99213 OFFICE O/P EST LOW 20 MIN: CPT
